# Patient Record
(demographics unavailable — no encounter records)

---

## 2024-11-07 NOTE — DISCUSSION/SUMMARY
[FreeTextEntry1] : A: 17-year-old for HPV Gardasil vaccine, contraception management  P: HPV vaccine counseling done-Gardasil No. 3 given Contraception counseling done-patient would like to continue current OCPs and considering progesterone only Safe sex practices Pain and bleeding precautions Encourage healthy diet and activity as tolerated Follow-up 3 to 6 months for medication management evaluation or as needed

## 2024-11-07 NOTE — HISTORY OF PRESENT ILLNESS
[Normal Amount/Duration] :  normal amount and duration [Frequency: Q ___ days] : menstrual periods occur approximately every [unfilled] days [Menarche Age: ____] : age at menarche was [unfilled] [No] : Patient does not have concerns regarding sex [Currently Active] : currently active [Women] : women [Y] : Patient is sexually active [TextBox_4] : 17-year-old G0 with LMP 10/10/2024 came for HPV Gardasil vaccine #3 and also contraception maintenance.  She denies any contraindications or adverse reactions after the first 2 injections.  Patient denies abnormal uterine bleeding, pelvic pain, discharge or dysuria.  She does state that once she started the low-dose triphasic OCPs she did have a lot of emotional and psychological side effects with a lot of crying and sadness.  She does state that she missed 1 dose and then once she resumed her symptoms has improved.  We discussed the risk/benefits/alternatives of all contraception management and may be trying contraception without estrogen.  Patient understood the risks and benefits and all questions answered satisfactorily.  As she states her symptoms have seemed to improve with time she would like to continue her current contraception and we will evaluate for a few more months.  She states that if her symptoms persist or worsen that she would be interested in trying progesterone only pill.  UCG negative [LMPDate] : 10/10/2024 [FreeTextEntry1] : 10/10/24 [Both] : men and women [Yes] : Yes [FreeTextEntry3] : ocps